# Patient Record
Sex: MALE | Race: WHITE | Employment: OTHER | URBAN - METROPOLITAN AREA
[De-identification: names, ages, dates, MRNs, and addresses within clinical notes are randomized per-mention and may not be internally consistent; named-entity substitution may affect disease eponyms.]

---

## 2017-01-10 ENCOUNTER — OFFICE VISIT (OUTPATIENT)
Dept: PAIN MEDICINE | Facility: CLINIC | Age: 57
End: 2017-01-10

## 2017-01-10 VITALS
BODY MASS INDEX: 23.25 KG/M2 | WEIGHT: 153.4 LBS | TEMPERATURE: 98.2 F | HEART RATE: 82 BPM | SYSTOLIC BLOOD PRESSURE: 133 MMHG | HEIGHT: 68 IN | RESPIRATION RATE: 16 BRPM | OXYGEN SATURATION: 97 % | DIASTOLIC BLOOD PRESSURE: 77 MMHG

## 2017-01-10 DIAGNOSIS — M47.816 SPONDYLOSIS OF LUMBAR REGION WITHOUT MYELOPATHY OR RADICULOPATHY: Primary | ICD-10-CM

## 2017-01-10 DIAGNOSIS — M47.816 SPONDYLOSIS OF LUMBAR REGION WITHOUT MYELOPATHY OR RADICULOPATHY: ICD-10-CM

## 2017-01-10 DIAGNOSIS — M19.90 ARTHRITIS: ICD-10-CM

## 2017-01-10 DIAGNOSIS — M47.816 FACET ARTHROPATHY, LUMBAR: ICD-10-CM

## 2017-01-10 PROBLEM — K58.0 IRRITABLE BOWEL SYNDROME WITH DIARRHEA: Status: ACTIVE | Noted: 2017-01-10

## 2017-01-10 PROCEDURE — 99213 OFFICE O/P EST LOW 20 MIN: CPT | Performed by: PAIN MEDICINE

## 2017-01-10 RX ORDER — DICLOFENAC SODIUM AND MISOPROSTOL 50; 200 MG/1; UG/1
1 TABLET, DELAYED RELEASE ORAL 2 TIMES DAILY PRN
Qty: 60 TABLET | Refills: 2 | Status: SHIPPED | OUTPATIENT
Start: 2017-01-10 | End: 2017-01-10 | Stop reason: SDUPTHER

## 2017-01-10 RX ORDER — DICLOFENAC SODIUM AND MISOPROSTOL 50; 200 MG/1; UG/1
1 TABLET, DELAYED RELEASE ORAL 2 TIMES DAILY PRN
Qty: 60 TABLET | Refills: 2 | Status: SHIPPED | OUTPATIENT
Start: 2017-01-10

## 2017-01-10 NOTE — PATIENT INSTRUCTIONS
- Given good benefit from two previous diagnosistic lumbar medial branch blocks, would likely receive longer pain relief with radiofrequency ablation of the lumbar medial branch nerves. Discussed with the patient regarding the etiology of their pain. Informed them that they would likely benefit from a bilateral radiofrequency ablation of the lumbar medial branch nerves from L3 to SA.  The procedure was described in detail and the risks, benefits and alternatives were discussed with the patient (including but not limited to: bleeding, infection, nerve damage, worsening of pain, inability to perform injection, paralysis, seizures, and death) who agreed to proceed. No sedation.     Radiofrequency Lesioning  Radiofrequency lesioning is a procedure that is performed to relieve pain. The procedure is often used for back, neck, or arm pain. Radiofrequency lesioning involves the use of a machine that creates radio waves to make heat. During the procedure, the heat is applied to the nerve that carries the pain signal. The heat damages the nerve and interferes with the pain signal. Pain relief usually lasts for 6 months to 1 year.  LET YOUR HEALTH CARE PROVIDER KNOW ABOUT:  · Any allergies you have.  · All medicines you are taking, including vitamins, herbs, eye drops, creams, and over-the-counter medicines.  · Previous problems you or members of your family have had with the use of anesthetics.  · Any blood disorders you have.  · Previous surgeries you have had.  · Any medical conditions you have.  · Whether you are pregnant or may be pregnant.  RISKS AND COMPLICATIONS  Generally, this is a safe procedure. However, problems may occur, including:  · Pain or soreness at the injection site.  · Infection at the injection site.  · Damage to nerves or blood vessels.  BEFORE THE PROCEDURE  · Ask your health care provider about:    Changing or stopping your regular medicines. This is especially important if you are taking diabetes  medicines or blood thinners.    Taking medicines such as aspirin and ibuprofen. These medicines can thin your blood. Do not take these medicines before your procedure if your health care provider instructs you not to.  · Follow instructions from your health care provider about eating or drinking restrictions.  · Plan to have someone take you home after the procedure.  · If you go home right after the procedure, plan to have someone with you for 24 hours.  PROCEDURE  · You will be given one or more of the following:    A medicine to help you relax (sedative).    A medicine to numb the area (local anesthetic).  · You will be awake during the procedure. You will need to be able to talk with the health care provider during the procedure.  · With the help of a type of X-ray (fluoroscopy), the health care provider will insert a radiofrequency needle into the area to be treated.  · Next, a wire that carries the radio waves (electrode) will be put through the radiofrequency needle. An electrical pulse will be sent through the electrode to verify the correct nerve. You will feel a tingling sensation, and you may have muscle twitching.  · Then, the tissue that is around the needle tip will be heated by an electric current that is passed using the radiofrequency machine. This will numb the nerves.  · A bandage (dressing) will be put on the insertion area after the procedure is done.  The procedure may vary among health care providers and hospitals.  AFTER THE PROCEDURE  · Your blood pressure, heart rate, breathing rate, and blood oxygen level will be monitored often until the medicines you were given have worn off.  · Return to your normal activities as directed by your health care provider.     This information is not intended to replace advice given to you by your health care provider. Make sure you discuss any questions you have with your health care provider.     Document Released: 08/15/2012 Document Revised: 09/07/2016  Document Reviewed: 01/25/2016  [employer] Interactive Patient Education ©2016 [employer]

## 2017-01-10 NOTE — PROGRESS NOTES
"CHIEF COMPLAINT: Back Pain    HPI  Roddy Steen is a 56 y.o. male.  He is here to follow up for Back Pain    Roddy Steen is a 56 y.o. male  who presents to the office for follow-up of procedure.  He completed a Bilateral lumbar facets on 11-02-16 and 10-19-16 performed for management of back pain. Patient reports 50-60% relief from the procedure. He said it helped for a few days to several days. But he states he has been very active and hasn't had an opportunity to really relax. He is trying to put up a fence for his five dogs.     Since last visit their pain has remain unchanged. He states the numbness and sciatica in his left leg has gotten worse. And has had pain in his left hip the past few days. He has a painful discomfort \"right square\" in the middle of his back which he states is constant. He states [facility] so he hasn't taken it yet. His IBS has gotten so bad this week he doesn't want to eat, and has lost 4 pounds since last week.    The patient states their pain is a 4 on a scale of 1-10 while sitting.  The patient describes this pain as a constant numbness, ache, sharp, shooting, stabbing, tingling, throbbing and deep. The pain is located in low back L>R and radiates down posterior aspect of left leg stopping before knee. This painful problem is aggravated by bending, lifting, movement and sitting,standing,walking and is alleviated by nothing. Main pain is not his intermittent leg radiation but is his constant axial pain that is worse with activity.   Daily numbness and tingling in his left toes. Intermittent and brought on by walking with improvement with sitting down. No weakness in BLE.      Past pain medications: occasionally Ibuprofen - twice/monthly  Naproxen - no help  Muscle relaxer - maybe flexeril - no help  Percocet - no help  Norco - no help  Diclofenac- maybe?     Current pain medications: Paxil - on depression/PTSD     Past therapies:  Physical Therapy: yes- it didn't " help last visit was early 2015  Chiropractor: yes - years ago- helped  Massage Therapy: yes - helped some  TENS: yes - helped  Neck or back surgery: no   Laser therapy: by PCP in 2014- didn't helped. Used on low back.     Previous Injection: 11/2/2016 - Bilateral L3-Sa  Effect of Injection (%): couple days  Length of Relief: 50-60%     Previous Injection: 10/19/2016 - Bilateral L3-Sa  Effect of Injection (%): couple days  Length of Relief: 50-60%     Previous Injections: yes He states that he was in pain management in 2014 in [place], [place] with Dr. Madi Taylor. He states that while there he had 3 back injections that didn't help. Thinks they were LESIs?  Relief: no    PEG Assessment   What number best describes your pain on average in the past week? 7  What number best describes how, during the past week, pain has interfered with your enjoyment of life? 7  What number best describes how, during the past week, pain has interfered with your general activity? 8      Current Outpatient Prescriptions:   •  albuterol (PROVENTIL HFA;VENTOLIN HFA) 108 (90 BASE) MCG/ACT inhaler, Inhale 2 puffs every 4 (four) hours as needed for wheezing., Disp: , Rfl:   •  albuterol (PROVENTIL) (2.5 MG/3ML) 0.083% nebulizer solution, Take 2.5 mg by nebulization every 4 (four) hours as needed for wheezing., Disp: , Rfl:   •  atorvastatin (LIPITOR) 10 MG tablet, Take 1 tablet by mouth daily., Disp: , Rfl:   •  eszopiclone (LUNESTA) 3 MG tablet, TAKE ONE TABLET BY MOUTH EVERY NIGHT AT BEDTIME, Disp: 30 tablet, Rfl: 5  •  fluticasone-salmeterol (ADVAIR) 250-50 MCG/DOSE DISKUS, Inhale 2 (two) times a day., Disp: , Rfl:   •  lisinopril (PRINIVIL,ZESTRIL) 30 MG tablet, Take 1 tablet by mouth daily., Disp: , Rfl:   •  mirtazapine (REMERON) 30 MG tablet, Take 0.5 tablets by mouth Every Night., Disp: , Rfl:   •  omeprazole (PriLOSEC) 20 MG capsule, Take 1 capsule (20 mg total) by mouth daily., Disp: 90 capsule, Rfl: 3  •  PARoxetine (PAXIL) 40 MG  tablet, Take 1 tablet by mouth daily., Disp: , Rfl:   •  tiotropium (SPIRIVA) 18 MCG per inhalation capsule, Spiriva HandiHaler 18 MCG Inhalation Capsule; Patient Sig: Spiriva HandiHaler 18 MCG Inhalation Capsule ; 0; 04-Jan-2016; Active, Disp: , Rfl:   •  topiramate (TOPAMAX) 50 MG tablet, Take by mouth., Disp: , Rfl:     IMAGING  LUMBAR MRI 7/8/2016:  FINDINGS:   The L1-2 disc is unremarkable. The canal and foramina are widely patent.  At L2-3 and L3-4, there is broad-based posterior disc bulging and osteophyte formation and moderate bilateral facet hypertrophy that results in moderate central spinal stenosis. Foraminal stenosis is mild bilaterally.  At L4-5, there is a broad-based posterior disc osteophyte complex with a more focal right-sided paracentral disc protrusion. This is accompanied by mild facet hypertrophy. Central spinal stenosis is moderately severe. Foraminal narrowing is mild bilaterally.  At L5-S1, the disc is degenerated and there is disc space collapse with a broad-based posterior disc osteophyte complex and reactive endplate changes. There is mild bilateral facet hypertrophy. Foraminal stenosis is moderate bilaterally and a little worse on the left than on the right. No discrete disc herniation is seen. The conus is normal. There is no evidence of marrow edema or paraspinous mass.   IMPRESSION: Degenerative changes, predominantly, at the lower 4 lumbar discs as described above level-by-level. The most prominent degenerative change is at L4-5 where there is moderately severe central spinal stenosis and a focal right paracentral protruding disc in addition to generalized posterior disc bulging and osteophyte formation. Foraminal stenosis is most prominent bilaterally at the L5-S1 level.    The following portions of the patient's history were reviewed and updated as appropriate: allergies, current medications, past family history, past medical history, past social history, past surgical history  "and problem list.    Review of Systems   Constitutional: Positive for appetite change (decreased) and unexpected weight change (weight loss of 4 lbs put states due to IBS). Negative for activity change, chills and fever.   HENT: Positive for congestion, sneezing and sore throat.    Respiratory: Positive for cough. Negative for shortness of breath and wheezing.    Cardiovascular: Positive for chest pain (pt said this has been ongoing but he doesnt have cardiologist. [facility] and states he will bring it up with them. He was having it back when he lived in [place] and cardioloist ran tests and didn't find anything, states pt). Negative for palpitations.   Gastrointestinal: Positive for abdominal pain and diarrhea. Negative for constipation, nausea and vomiting.   Genitourinary: Negative for difficulty urinating.   Musculoskeletal: Positive for back pain.   Neurological: Positive for numbness. Negative for dizziness, weakness, light-headedness and headaches.   Psychiatric/Behavioral: Positive for agitation and sleep disturbance. Negative for confusion, hallucinations and suicidal ideas. The patient is nervous/anxious.    All other systems reviewed and are negative.      Vitals:    01/10/17 1455   BP: 133/77   Pulse: 82   Resp: 16   Temp: 98.2 °F (36.8 °C)   SpO2: 97%   Weight: 153 lb 6.4 oz (69.6 kg)   Height: 68\" (172.7 cm)   PainSc:   4   PainLoc: Back       Physical Exam   Constitutional: He is oriented to person, place, and time. He appears well-developed and well-nourished. No distress.   HENT:   Head: Normocephalic and atraumatic.   Nose: Nose normal.   Mouth/Throat: Oropharynx is clear and moist.   Eyes: Conjunctivae and EOM are normal.   Neck: Normal range of motion. Neck supple.   Pulmonary/Chest: Effort normal. No stridor. No respiratory distress.   Musculoskeletal:        Lumbar back: He exhibits pain. He exhibits no tenderness and no bony tenderness.   +bilateral lumbar facet loading  "   Neurological: He is oriented to person, place, and time. He has normal strength. Gait normal.   Skin: Skin is warm and dry. No rash noted. He is not diaphoretic.   Psychiatric: He has a normal mood and affect. His behavior is normal.   Nursing note and vitals reviewed.    Ortho Exam  Neurologic Exam     Mental Status   Oriented to person, place, and time.     Cranial Nerves   Cranial nerves II through XII intact.     CN III, IV, VI   Extraocular motions are normal.     Motor Exam     Strength   Strength 5/5 throughout.     Sensory Exam   Light touch normal.     Gait, Coordination, and Reflexes     Gait  Gait: normal      Pain Management Panel     Pain Management Panel Latest Ref Rng 9/22/2016    AMPHETAMINES SCREEN, URINE Negative Negative    BARBITURATES SCREEN Negative Negative    BENZODIAZEPINE SCREEN, URINE Negative Negative    COCAINE SCREEN, URINE Negative Negative    METHADONE SCREEN, URINE Negative Negative        Last UDS: 9/22/2016  Comments: no meds - Consistent       Date of last GRETCHEN reviewed : 01/10/17   Comments: Consistent       Assessment/Plan   Roddy was seen today for back pain.    Diagnoses and all orders for this visit:    Spondylosis of lumbar region without myelopathy or radiculopathy  -     Case Request    Facet arthropathy, lumbar  -     Case Request    - Given good benefit from two previous diagnosistic lumbar medial branch blocks, would likely receive longer pain relief with radiofrequency ablation of the lumbar medial branch nerves. Discussed with the patient regarding the etiology of their pain. Informed them that they would likely benefit from a bilateral radiofrequency ablation of the lumbar medial branch nerves from L3 to SA.  The procedure was described in detail and the risks, benefits and alternatives were discussed with the patient (including but not limited to: bleeding, infection, nerve damage, worsening of pain, inability to perform injection, paralysis, seizures, and  death) who agreed to proceed. No sedation.   - Continue home exercise program.     - Recommend continuing all meds, no changes. Can consider gabapentin in the future if left foot numbness continues, worsens or doesn't improve.   - Consider referral to [facility] in future if lumbar spinal stenosis worsens.   - Will address lumbar radiculopathy after we address lumbar spondylosis. Will consider a LESI in future.  Sounds like lumbar radiculopathy has improved with LMBB and may not need LESI in future.   - If left knee becomes non responsive to gel injections and consider left genicular knee injection in future. Will watch for now and let ortho manage.    - Body mass index is 23.32 kg/(m^2).     Follow-up in 2 months.    Julianna Lima MD  Pain Management

## 2022-06-13 ENCOUNTER — TELEPHONE (OUTPATIENT)
Dept: ORTHOPEDIC SURGERY | Facility: CLINIC | Age: 62
End: 2022-06-13

## 2022-06-13 NOTE — TELEPHONE ENCOUNTER
PATIENT SEEN AT [facility] AND HAS PREVIOUSLY BEEN SEEN BY OTHER ORTHO SURGEONS. CALLED PATIENT TO LET HIM KNOW WE NEED HIS MEDICAL RECORDS FROM THEM TO BE REVIEWED. HE SAID HE WOULD EMAIL THEM OVER.

## 2022-06-27 ENCOUNTER — TELEPHONE (OUTPATIENT)
Dept: ORTHOPEDIC SURGERY | Facility: CLINIC | Age: 62
End: 2022-06-27

## 2022-06-27 NOTE — TELEPHONE ENCOUNTER
STRAIN OF LT ACHILLES TENDON (REF ALSO SAYS RUPTURE). THIS IS FOR 2ND OPINION. MRI LT ANKLE 3/6. RCVD/INDXD PN FROM [facility]: 5/3, 3/22, LABS 2/9, EXT MED RECS, XRAY LT ANKLE 1/22.

## 2024-07-29 ENCOUNTER — OFFICE VISIT (OUTPATIENT)
Dept: ORTHOPEDIC SURGERY | Facility: CLINIC | Age: 64
End: 2024-07-29
Payer: MEDICARE

## 2024-07-29 VITALS
HEART RATE: 65 BPM | HEIGHT: 68 IN | DIASTOLIC BLOOD PRESSURE: 96 MMHG | BODY MASS INDEX: 27.31 KG/M2 | WEIGHT: 180.2 LBS | SYSTOLIC BLOOD PRESSURE: 160 MMHG | OXYGEN SATURATION: 94 %

## 2024-07-29 DIAGNOSIS — M25.532 LEFT WRIST PAIN: Primary | ICD-10-CM

## 2024-07-29 DIAGNOSIS — M77.8 LEFT WRIST TENDONITIS: ICD-10-CM

## 2024-07-29 PROCEDURE — 1159F MED LIST DOCD IN RCRD: CPT | Performed by: STUDENT IN AN ORGANIZED HEALTH CARE EDUCATION/TRAINING PROGRAM

## 2024-07-29 PROCEDURE — 3080F DIAST BP >= 90 MM HG: CPT | Performed by: STUDENT IN AN ORGANIZED HEALTH CARE EDUCATION/TRAINING PROGRAM

## 2024-07-29 PROCEDURE — 3077F SYST BP >= 140 MM HG: CPT | Performed by: STUDENT IN AN ORGANIZED HEALTH CARE EDUCATION/TRAINING PROGRAM

## 2024-07-29 PROCEDURE — 99203 OFFICE O/P NEW LOW 30 MIN: CPT | Performed by: STUDENT IN AN ORGANIZED HEALTH CARE EDUCATION/TRAINING PROGRAM

## 2024-07-29 PROCEDURE — 1160F RVW MEDS BY RX/DR IN RCRD: CPT | Performed by: STUDENT IN AN ORGANIZED HEALTH CARE EDUCATION/TRAINING PROGRAM

## 2024-07-29 PROCEDURE — 20605 DRAIN/INJ JOINT/BURSA W/O US: CPT | Performed by: STUDENT IN AN ORGANIZED HEALTH CARE EDUCATION/TRAINING PROGRAM

## 2024-07-29 RX ORDER — LIDOCAINE HYDROCHLORIDE 10 MG/ML
1 INJECTION, SOLUTION INFILTRATION; PERINEURAL
Status: COMPLETED | OUTPATIENT
Start: 2024-07-29 | End: 2024-07-29

## 2024-07-29 RX ORDER — TRIAMCINOLONE ACETONIDE 40 MG/ML
40 INJECTION, SUSPENSION INTRA-ARTICULAR; INTRAMUSCULAR
Status: COMPLETED | OUTPATIENT
Start: 2024-07-29 | End: 2024-07-29

## 2024-07-29 RX ADMIN — TRIAMCINOLONE ACETONIDE 40 MG: 40 INJECTION, SUSPENSION INTRA-ARTICULAR; INTRAMUSCULAR at 14:34

## 2024-07-29 RX ADMIN — LIDOCAINE HYDROCHLORIDE 1 ML: 10 INJECTION, SOLUTION INFILTRATION; PERINEURAL at 14:34

## 2024-07-29 NOTE — PROGRESS NOTES
"Chief Complaint  Initial Evaluation of the Left Wrist    Subjective          Roddy Steen presents to [facility] for an evaluation of his left wrist.     History of Present Illness    The patient presents here today for an evaluation of his left wrist.  He was installing camera's around his barn when he dropped the drill and injured his wrist. He reports this happened about a year ago. He presents today without a brace. He reports his pain improved but has since returned. He did have a steroid injection in the past with great relief.     No Known Allergies     Social History     Socioeconomic History    Marital status:    Tobacco Use    Smoking status: Former     Types: Cigarettes    Smokeless tobacco: Current     Types: Snuff    Tobacco comments:     [age], 1 ppd   Vaping Use    Vaping status: Never Used   Substance and Sexual Activity    Alcohol use: Yes    Drug use: No    Sexual activity: Yes     Partners: Female        I reviewed the patient's chief complaint, history of present illness, review of systems, past medical history, surgical history, family history, social history, medications, and allergy list.     REVIEW OF SYSTEMS    Constitutional: Denies fevers, chills, weight loss  Cardiovascular: Denies chest pain, shortness of breath  Skin: Denies rashes, acute skin changes  Neurologic: Denies headache, loss of consciousness  MSK: Left wrist pain       Objective   Vital Signs:   /96   Pulse 65   Ht 172.7 cm (67.99\")   Wt 81.7 kg (180 lb 3.2 oz)   SpO2 94%   BMI 27.41 kg/m²     Body mass index is 27.41 kg/m².    Physical Exam    General: Alert. No acute distress.   Left upper extremity: 45 degrees extension, 60 degrees flexion, 90 degrees supination  and pronation, neurovascularly intact ,no mechanical symptoms,  sensation intact to the medial , radial and ulnar nerve.  Palpable radial pulse.    Medium Joint Arthrocentesis  Date/Time: 7/29/2024 2:34 PM  Consent " given by: patient  Site marked: site marked  Timeout: Immediately prior to procedure a time out was called to verify the correct patient, procedure, equipment, support staff and site/side marked as required   Supporting Documentation  Indications: pain   Procedure Details  Location: wrist (LEFT) -   Needle size: 23 G  Medications administered: 1 mL lidocaine 1 %; 40 mg triamcinolone acetonide 40 MG/ML  Patient tolerance: patient tolerated the procedure well with no immediate complications      This injection documentation was Scribed for Kenn Max MD by Elodia Qureshi.  07/29/24   14:35 EDT    MRI left wrist    [facility]   09/27/23  Impression :   Extensor carpi ulnaris tendinosis and tenosynovitis with a split tear at the ulnar styloid     Imaging Results (Most Recent)       Procedure Component Value Units Date/Time    XR Wrist 2 View Left [[id number]] Resulted: 07/29/24 1455     Updated: 07/29/24 1455    Narrative:      Indications: Left wrist pain    Views: AP and lateral left wrist    Findings: No fractures are seen.  There are mild degenerative changes in   the wrist and hand.  All joints appear reduced.    Comparative Data: No comparative data available                     Assessment and Plan        XR Wrist 2 View Left    Result Date: 7/29/2024  Narrative: Indications: Left wrist pain Views: AP and lateral left wrist Findings: No fractures are seen.  There are mild degenerative changes in the wrist and hand.  All joints appear reduced. Comparative Data: No comparative data available    XR L Spine Ap & Lateral-Standing    Result Date: 7/1/2024  Narrative: This result has an attachment that is not available. AP    Impression: and lateral x-rays of the lumbar spine demonstrate the instrumentation and graft in good position. No evidence of loosening or subsidence. Fusion appears to be incorporating adequately.  He has a moderate adjacent level degeneration L2-L5 with pretty marked retrolisthesis at L4-5.       Diagnoses and all orders for this visit:    1. Left wrist pain (Primary)  -     XR Wrist 2 View Left    2. Left wrist tendonitis    Other orders  -     Medium Joint Arthrocentesis      The patient presents here today for an evaluation  of his left wrist. X-rays were obtained in the office today and these were reviewed today.     Discussed the risks and benefits of a left wrist steroid injection today in the office. Patient expressed understanding and wishes to proceed. He tolerated the injection well and without any complications.      Call or return if worsening symptoms.    Scribed for Kenn Max MD by Maliha Maxwell  07/29/2024   13:47 EDT         Follow Up       As needed    Patient was given instructions and counseling regarding his condition or for health maintenance advice. Please see specific information pulled into the AVS if appropriate.       I have personally performed the services described in this document as scribed by the above individual and it is both accurate and complete. Kenn Max MD 07/29/24 16:17 EDT

## 2024-10-10 NOTE — PROGRESS NOTES
"Chief Complaint: Elevated PSA    Subjective         History of Present Illness  Roddy Steen is a 64 y.o. male presents to [facility] to be seen for elevated PSA.    Patient presents reporting he was found to have elevated PSA on routine screening.     Frequency-denies    Urgency-admits    Incontinence-denies    Nocturia-2-3    Perineal pain-denies    Dysuria-denies    Stream-good    GH-denies    History of stones-denies     surgeries-vasectomy    Family history of  malignancy-father- prostate CA- around 75 at dx, brother- prostate CA- around 59 years old at dx    Cardiopulmonary-HTN, leaky valve- \"cleared\"; COPD, pulmonary fibrosis    Anticoagulants-denies    Smoker-quit 1/2011; smoked for 35 years    PSA  7/15/2024 6.864    Objective     Past Medical History:   Diagnosis Date    COPD (chronic obstructive pulmonary disease)     Emphysema of lung     Fibrosis lung     History of colonoscopy     Complete    Hypertension     IBS (irritable bowel syndrome)     Migraine     PTSD (post-traumatic stress disorder)     Rotator cuff tear     Sleep apnea        Past Surgical History:   Procedure Laterality Date    APPENDECTOMY  02/2005    PALATOPHARYNGOPLASTY  06/2010    ROTATOR CUFF REPAIR Right 11/2015    VASECTOMY  06/1963    Vas Deferens Vasectomy         Current Outpatient Medications:     albuterol (PROVENTIL HFA;VENTOLIN HFA) 108 (90 BASE) MCG/ACT inhaler, Inhale 2 puffs Every 4 (Four) Hours As Needed for Wheezing., Disp: , Rfl:     albuterol (PROVENTIL) (2.5 MG/3ML) 0.083% nebulizer solution, Take 2.5 mg by nebulization Every 4 (Four) Hours As Needed for Wheezing., Disp: , Rfl:     amLODIPine (NORVASC) 10 MG tablet, Take 0.5 tablets by mouth Daily., Disp: , Rfl:     atorvastatin (LIPITOR) 10 MG tablet, Take 1 tablet by mouth Daily., Disp: , Rfl:     celecoxib (CeleBREX) 200 MG capsule, Take 1 capsule by mouth Daily., Disp: , Rfl:     cetirizine (zyrTEC) 10 MG tablet, Take 0.5 tablets by " mouth Daily., Disp: , Rfl:     cholestyramine (QUESTRAN) 4 GM/DOSE powder, Take 1 packet by mouth 2 (Two) Times a Day With Meals., Disp: , Rfl:     Diclofenac Sodium (VOLTAREN) 1 % gel gel, Apply 4 g topically to the appropriate area as directed 2 (Two) Times a Day., Disp: , Rfl:     EPINEPHrine (EPIPEN) 0.3 MG/0.3ML solution auto-injector injection, Inject 0.15 mg into the appropriate muscle as directed by prescriber 1 (One) Time., Disp: , Rfl:     escitalopram (LEXAPRO) 20 MG tablet, Take 1 tablet by mouth Daily., Disp: , Rfl:     hydroCHLOROthiazide 25 MG tablet, Take 1 tablet by mouth Daily., Disp: , Rfl:     hydrOXYzine (ATARAX) 50 MG tablet, Take 1 tablet by mouth Every 4 (Four) Hours As Needed., Disp: , Rfl:     ibuprofen (ADVIL,MOTRIN) 800 MG tablet, Take 1 tablet by mouth Every 8 (Eight) Hours As Needed., Disp: , Rfl:     losartan (COZAAR) 100 MG tablet, Take 0.5 tablets by mouth Daily., Disp: , Rfl:     montelukast (SINGULAIR) 10 MG tablet, Take 1 tablet by mouth Every Night., Disp: , Rfl:     mupirocin (BACTROBAN) 2 % ointment, Apply 1 Application topically to the appropriate area as directed Daily., Disp: , Rfl:     PARoxetine (PAXIL) 40 MG tablet, Take 1 tablet by mouth Daily., Disp: , Rfl:     propranolol (INDERAL) 20 MG tablet, Take 0.5 tablets by mouth 2 (Two) Times a Day., Disp: , Rfl:     SUMAtriptan (IMITREX) 25 MG tablet, Take 2 tablets by mouth 1 (One) Time., Disp: , Rfl:     Trelegy Ellipta 100-62.5-25 MCG/ACT inhaler, Inhale 1 puff Daily., Disp: , Rfl:     diclofenac-misoprostol (ARTHROTEC 50) 50-0.2 MG EC tablet, Take 1 tablet by mouth 2 (Two) Times a Day As Needed (pain)., Disp: 60 tablet, Rfl: 2    fluticasone-salmeterol (ADVAIR) 250-50 MCG/DOSE DISKUS, Inhale 2 (two) times a day., Disp: , Rfl:     mirtazapine (REMERON) 30 MG tablet, Take 0.5 tablets by mouth Every Night., Disp: , Rfl:     tiotropium (SPIRIVA) 18 MCG per inhalation capsule, Spiriva HandiHaler 18 MCG Inhalation Capsule;  "Patient Sig: Spiriva HandiHaler 18 MCG Inhalation Capsule ; 0; 04-Jan-2016; Active, Disp: , Rfl:     Allergies   Allergen Reactions    Gabapentin Hallucinations and Other (See Comments)     Gabapentin mixed with hydrocodone, delusions hallucinations    Hallucinations- when combined with Hydrocodone        Family History   Problem Relation Age of Onset    Alcohol abuse Mother     Hypertension Father     Colon cancer Father     Alcohol abuse Brother        Social History     Socioeconomic History    Marital status:    Tobacco Use    Smoking status: Former     Types: Cigarettes     Passive exposure: Past    Smokeless tobacco: Current     Types: Snuff    Tobacco comments:     15-50, 1 ppd   Vaping Use    Vaping status: Never Used   Substance and Sexual Activity    Alcohol use: Yes    Drug use: No    Sexual activity: Yes     Partners: Female       Vital Signs:   /85 (BP Location: Left arm, Patient Position: Sitting, Cuff Size: Large Adult)   Pulse 61   Ht 172.7 cm (67.99\")   Wt 82.1 kg (181 lb)   BMI 27.53 kg/m²      Physical Exam  Vitals reviewed.   Constitutional:       Appearance: Normal appearance.   Neurological:      General: No focal deficit present.      Mental Status: He is alert and oriented to person, place, and time.   Psychiatric:         Mood and Affect: Mood normal.         Behavior: Behavior normal.          Result Review :   The following data was reviewed by: JOHANNE Aldridge on 10/14/2024:       Bladder Scan interpretation 10/14/2024    Estimation of residual urine via [email or web address] Bladder Scan  MA/nurse performing: Galina CASTRO MA  Residual Urine: 0 ml  Indication: Elevated prostate specific antigen (PSA)    Elevated blood pressure reading   Position: Supine  Examination: Incremental scanning of the suprapubic area using 2.0 MHz transducer using copious amounts of acoustic gel.   Findings: An anechoic area was demonstrated which represented the bladder, with " measurement of residual urine as noted. I inspected this myself. In that the residual urine was stable or insignificant, refer to plan for treatment and plan necessary at this time.         Procedures        Assessment and Plan    Diagnoses and all orders for this visit:    1. Elevated prostate specific antigen (PSA) (Primary)  -     Bladder Scan  -     MRI Prostate With & Without Contrast; Future    2. Elevated blood pressure reading    Patient was advised to follow-up with his PCP regarding elevated blood pressure.    Elevated PSA-lab results discussed at length with patient.  Discussed several options with patient including a standard prostate biopsy; obtaining an MRI of prostate with possible subsequent fusion biopsy as results dictate; or to treat with a month-long course of antibiotics and then repeat the PSA in 3 months.    Given that he reported that he had previously had an elevation in his elevated again and he has an extensive family history of prostate cancer, we will go ahead and order an MRI of the prostate to delineate any underlying etiology of elevated PSA and potentially provide mapping for fusion biopsy.    I will see him back 1 week after MRI to go over results and come up with a plan.    Follow Up   No follow-ups on file.  Patient was given instructions and counseling regarding his condition or for health maintenance advice. Please see specific information pulled into the AVS if appropriate.         This document has been electronically signed by JOHANNE Aldridge  October 14, 2024 11:26 EDT

## 2024-10-14 ENCOUNTER — OFFICE VISIT (OUTPATIENT)
Dept: UROLOGY | Facility: CLINIC | Age: 64
End: 2024-10-14
Payer: OTHER GOVERNMENT

## 2024-10-14 VITALS
HEART RATE: 61 BPM | WEIGHT: 181 LBS | DIASTOLIC BLOOD PRESSURE: 85 MMHG | SYSTOLIC BLOOD PRESSURE: 179 MMHG | BODY MASS INDEX: 27.43 KG/M2 | HEIGHT: 68 IN

## 2024-10-14 DIAGNOSIS — R97.20 ELEVATED PROSTATE SPECIFIC ANTIGEN (PSA): Primary | ICD-10-CM

## 2024-10-14 DIAGNOSIS — R03.0 ELEVATED BLOOD PRESSURE READING: ICD-10-CM

## 2024-10-14 LAB — URINE VOLUME: 0

## 2024-10-14 PROCEDURE — 99203 OFFICE O/P NEW LOW 30 MIN: CPT | Performed by: NURSE PRACTITIONER

## 2024-10-14 PROCEDURE — 51798 US URINE CAPACITY MEASURE: CPT | Performed by: NURSE PRACTITIONER

## 2024-10-14 RX ORDER — PROPRANOLOL HCL 20 MG
10 TABLET ORAL 2 TIMES DAILY
COMMUNITY

## 2024-10-14 RX ORDER — IBUPROFEN 800 MG/1
800 TABLET, FILM COATED ORAL EVERY 8 HOURS PRN
COMMUNITY
Start: 2024-07-17

## 2024-10-14 RX ORDER — HYDROCHLOROTHIAZIDE 25 MG/1
1 TABLET ORAL DAILY
COMMUNITY

## 2024-10-14 RX ORDER — CHOLESTYRAMINE 4 G/9G
4 POWDER, FOR SUSPENSION ORAL 2 TIMES DAILY WITH MEALS
COMMUNITY

## 2024-10-14 RX ORDER — SUMATRIPTAN 25 MG/1
50 TABLET, FILM COATED ORAL ONCE
COMMUNITY

## 2024-10-14 RX ORDER — LOSARTAN POTASSIUM 100 MG/1
50 TABLET ORAL DAILY
COMMUNITY

## 2024-10-14 RX ORDER — MONTELUKAST SODIUM 10 MG/1
10 TABLET ORAL NIGHTLY
COMMUNITY

## 2024-10-14 RX ORDER — HYDROXYZINE HYDROCHLORIDE 50 MG/1
50 TABLET, FILM COATED ORAL EVERY 4 HOURS PRN
COMMUNITY

## 2024-10-14 RX ORDER — MUPIROCIN 20 MG/G
1 OINTMENT TOPICAL DAILY
COMMUNITY
Start: 2024-06-24

## 2024-10-14 RX ORDER — FLUTICASONE FUROATE, UMECLIDINIUM BROMIDE AND VILANTEROL TRIFENATATE 100; 62.5; 25 UG/1; UG/1; UG/1
1 POWDER RESPIRATORY (INHALATION)
COMMUNITY
Start: 2024-07-24

## 2024-10-14 RX ORDER — AMLODIPINE BESYLATE 10 MG/1
5 TABLET ORAL DAILY
COMMUNITY

## 2024-10-14 RX ORDER — CETIRIZINE HYDROCHLORIDE 10 MG/1
5 TABLET ORAL DAILY
COMMUNITY

## 2024-10-14 RX ORDER — ESCITALOPRAM OXALATE 20 MG/1
1 TABLET ORAL DAILY
COMMUNITY

## 2024-10-14 RX ORDER — EPINEPHRINE 0.3 MG/.3ML
0.15 INJECTION SUBCUTANEOUS ONCE
COMMUNITY

## 2024-10-14 RX ORDER — CELECOXIB 200 MG/1
200 CAPSULE ORAL DAILY
COMMUNITY

## 2025-01-14 NOTE — PROGRESS NOTES
"Chief Complaint: Elevated PSA    Subjective         History of Present Illness  Roddy Steen is a 64 y.o. male presents to [facility] to be seen for follow-up.    Patient was previously seen by me with last visit on 10/14/2024 for elevated PSA.  He is here to follow-up on his MRI results.  Results of MRI reviewed with patient.        MRI results.  1/8/2025  Prostate volume 29 cc, PSA density 0.24  Impression:  1.  A lesion in the left anterior transition zone at the mid gland is at high suspicion for malignancy with an overall PI-RADS score of 4    Previous 10/14/2024:  Patient presents reporting he was found to have elevated PSA on routine screening.      Frequency-denies   Urgency-admits   Incontinence-denies   Nocturia-2-3   Perineal pain-denies   Dysuria-denies   Stream-good   GH-denies   History of stones-denies    surgeries-vasectomy   Family history of  malignancy-father- prostate CA- around 75 at dx, brother- prostate CA- around 59 years old at dx   Cardiopulmonary-HTN, leaky valve- \"cleared\"; COPD, pulmonary fibrosis   Anticoagulants-denies   Smoker-quit 1/2011; smoked for 35 years     PSA  7/15/2024 6.864    Objective     Past Medical History:   Diagnosis Date    COPD (chronic obstructive pulmonary disease)     Emphysema of lung     Fibrosis lung     History of colonoscopy     Complete    Hypertension     IBS (irritable bowel syndrome)     Migraine     PTSD (post-traumatic stress disorder)     Rotator cuff tear     Sleep apnea        Past Surgical History:   Procedure Laterality Date    APPENDECTOMY  02/2005    PALATOPHARYNGOPLASTY  06/2010    ROTATOR CUFF REPAIR Right 11/2015    VASECTOMY  06/1963    Vas Deferens Vasectomy         Current Outpatient Medications:     albuterol (PROVENTIL HFA;VENTOLIN HFA) 108 (90 BASE) MCG/ACT inhaler, Inhale 2 puffs Every 4 (Four) Hours As Needed for Wheezing., Disp: , Rfl:     amLODIPine (NORVASC) 10 MG tablet, Take 0.5 tablets by mouth " Daily., Disp: , Rfl:     atorvastatin (LIPITOR) 10 MG tablet, Take 1 tablet by mouth Daily., Disp: , Rfl:     celecoxib (CeleBREX) 200 MG capsule, Take 1 capsule by mouth Daily., Disp: , Rfl:     cetirizine (zyrTEC) 10 MG tablet, Take 0.5 tablets by mouth Daily., Disp: , Rfl:     EPINEPHrine (EPIPEN) 0.3 MG/0.3ML solution auto-injector injection, Inject 0.15 mg into the appropriate muscle as directed by prescriber 1 (One) Time., Disp: , Rfl:     escitalopram (LEXAPRO) 20 MG tablet, Take 1 tablet by mouth Daily., Disp: , Rfl:     famotidine (PEPCID) 40 MG tablet, Take 1 tablet by mouth Daily., Disp: , Rfl:     fluticasone-salmeterol (ADVAIR) 250-50 MCG/DOSE DISKUS, Inhale 2 (two) times a day., Disp: , Rfl:     hydroCHLOROthiazide 25 MG tablet, Take 1 tablet by mouth Daily., Disp: , Rfl:     ibuprofen (ADVIL,MOTRIN) 800 MG tablet, Take 1 tablet by mouth Every 8 (Eight) Hours As Needed., Disp: , Rfl:     losartan (COZAAR) 100 MG tablet, Take 0.5 tablets by mouth Daily., Disp: , Rfl:     montelukast (SINGULAIR) 10 MG tablet, Take 1 tablet by mouth Every Night., Disp: , Rfl:     mupirocin (BACTROBAN) 2 % ointment, Apply 1 Application topically to the appropriate area as directed Daily., Disp: , Rfl:     PARoxetine (PAXIL) 40 MG tablet, Take 1 tablet by mouth Daily., Disp: , Rfl:     SUMAtriptan (IMITREX) 25 MG tablet, Take 2 tablets by mouth 1 (One) Time., Disp: , Rfl:     Trelegy Ellipta 100-62.5-25 MCG/ACT inhaler, Inhale 1 puff Daily., Disp: , Rfl:     albuterol (PROVENTIL) (2.5 MG/3ML) 0.083% nebulizer solution, Take 2.5 mg by nebulization Every 4 (Four) Hours As Needed for Wheezing. (Patient not taking: Reported on 1/15/2025), Disp: , Rfl:     cholestyramine (QUESTRAN) 4 GM/DOSE powder, Take 1 packet by mouth 2 (Two) Times a Day With Meals. (Patient not taking: Reported on 1/15/2025), Disp: , Rfl:     ciprofloxacin (CIPRO) 500 MG tablet, Take 1 tablet by mouth 2 (Two) Times a Day for 3 days. Start day before  "procedure, Disp: 6 tablet, Rfl: 0    Diclofenac Sodium (VOLTAREN) 1 % gel gel, Apply 4 g topically to the appropriate area as directed 2 (Two) Times a Day. (Patient not taking: Reported on 1/15/2025), Disp: , Rfl:     diclofenac-misoprostol (ARTHROTEC 50) 50-0.2 MG EC tablet, Take 1 tablet by mouth 2 (Two) Times a Day As Needed (pain). (Patient not taking: Reported on 1/15/2025), Disp: 60 tablet, Rfl: 2    hydrOXYzine (ATARAX) 50 MG tablet, Take 1 tablet by mouth Every 4 (Four) Hours As Needed. (Patient not taking: Reported on 1/15/2025), Disp: , Rfl:     mirtazapine (REMERON) 30 MG tablet, Take 0.5 tablets by mouth Every Night., Disp: , Rfl:     propranolol (INDERAL) 20 MG tablet, Take 0.5 tablets by mouth 2 (Two) Times a Day. (Patient not taking: Reported on 1/15/2025), Disp: , Rfl:     tiotropium (SPIRIVA) 18 MCG per inhalation capsule, Spiriva HandiHaler 18 MCG Inhalation Capsule; Patient Sig: Spiriva HandiHaler 18 MCG Inhalation Capsule ; 0; 04-Jan-2016; Active, Disp: , Rfl:     Allergies   Allergen Reactions    Gabapentin Hallucinations and Other (See Comments)     Gabapentin mixed with hydrocodone, delusions hallucinations    Hallucinations- when combined with Hydrocodone        Family History   Problem Relation Age of Onset    Alcohol abuse Mother     Hypertension Father     Colon cancer Father     Alcohol abuse Brother        Social History     Socioeconomic History    Marital status:    Tobacco Use    Smoking status: Former     Types: Cigarettes     Passive exposure: Past    Smokeless tobacco: Current     Types: Snuff    Tobacco comments:     15-50, 1 ppd   Vaping Use    Vaping status: Never Used   Substance and Sexual Activity    Alcohol use: Yes    Drug use: No    Sexual activity: Yes     Partners: Female       Vital Signs:   Resp 14   Ht 172.7 cm (67.99\")   Wt 82.1 kg (181 lb)   BMI 27.53 kg/m²      Physical Exam  Vitals reviewed.   Constitutional:       Appearance: Normal appearance. "   Neurological:      General: No focal deficit present.      Mental Status: He is alert and oriented to person, place, and time.   Psychiatric:         Mood and Affect: Mood normal.         Behavior: Behavior normal.          Result Review :   The following data was reviewed by: JOHANNE Aldridge on 01/15/2025:  Results for orders placed or performed in visit on 10/14/24   Bladder Scan    Collection Time: 10/14/24 11:10 AM   Result Value Ref Range    Urine Volume 0             Procedures        Assessment and Plan    Diagnoses and all orders for this visit:    1. Elevated prostate specific antigen (PSA) (Primary)  -     ciprofloxacin (CIPRO) 500 MG tablet; Take 1 tablet by mouth 2 (Two) Times a Day for 3 days. Start day before procedure  Dispense: 6 tablet; Refill: 0  -     Case Request; Standing  -     sodium chloride 0.9 % flush 3 mL  -     sodium chloride 0.9 % flush 10 mL  -     sodium chloride 0.9 % infusion 40 mL  -     cefTRIAXone (ROCEPHIN) injection 1 g  -     Case Request    Other orders  -     Follow Anesthesia Guidelines / Protocol; Future  -     Follow Anesthesia Guidelines / Protocol; Standing  -     Verify / Perform Chlorhexidine Skin Prep; Standing  -     Provide NPO Instructions to Patient; Future  -     Chlorhexidine Skin Prep; Future  -     Insert Peripheral IV; Standing  -     Saline Lock & Maintain IV Access; Standing  -     Place Sequential Compression Device; Standing  -     Maintain Sequential Compression Device; Standing    Results of MRI reviewed with patient.  He does understand that he will need to have an MRI fusion biopsy.  He does understand that he will start on Cipro the day before the procedure.  He does understand that he will be n.p.o. after midnight with exception of sips of water to take his medications.  He is not on any blood thinners or diabetes or weight loss medications.     Risk, benefits, and alternatives of the patient were discussed at length.  Risks including  but not limited to bleeding, infection, damage to surrounding structure, pain, need for further procedures.  Also aware this is a procedure performed under anesthesia which has inherent risks including up to death.  Patient notes understanding, agreeable to proceed.     He will follow-up with Dr. Garcia after the procedure to go over the results and come up with a plan of treatment.    Follow Up   No follow-ups on file.  Patient was given instructions and counseling regarding his condition or for health maintenance advice. Please see specific information pulled into the AVS if appropriate.         This document has been electronically signed by JOHANNE Aldridge  January 15, 2025 11:48 EST

## 2025-01-15 ENCOUNTER — OFFICE VISIT (OUTPATIENT)
Dept: UROLOGY | Age: 65
End: 2025-01-15
Payer: OTHER GOVERNMENT

## 2025-01-15 VITALS — WEIGHT: 181 LBS | RESPIRATION RATE: 14 BRPM | BODY MASS INDEX: 27.43 KG/M2 | HEIGHT: 68 IN

## 2025-01-15 DIAGNOSIS — R97.20 ELEVATED PROSTATE SPECIFIC ANTIGEN (PSA): Primary | ICD-10-CM

## 2025-01-15 RX ORDER — SODIUM CHLORIDE 0.9 % (FLUSH) 0.9 %
10 SYRINGE (ML) INJECTION AS NEEDED
OUTPATIENT
Start: 2025-01-15

## 2025-01-15 RX ORDER — SODIUM CHLORIDE 0.9 % (FLUSH) 0.9 %
3 SYRINGE (ML) INJECTION EVERY 12 HOURS SCHEDULED
OUTPATIENT
Start: 2025-01-15

## 2025-01-15 RX ORDER — FAMOTIDINE 40 MG/1
1 TABLET, FILM COATED ORAL DAILY
COMMUNITY
Start: 2024-11-27

## 2025-01-15 RX ORDER — CIPROFLOXACIN 500 MG/1
500 TABLET, FILM COATED ORAL 2 TIMES DAILY
Qty: 6 TABLET | Refills: 0 | Status: SHIPPED | OUTPATIENT
Start: 2025-01-15 | End: 2025-01-18

## 2025-01-15 RX ORDER — CEFTRIAXONE 1 G/1
1 INJECTION, POWDER, FOR SOLUTION INTRAMUSCULAR; INTRAVENOUS ONCE
OUTPATIENT
Start: 2025-01-15

## 2025-01-15 RX ORDER — SODIUM CHLORIDE 9 MG/ML
40 INJECTION, SOLUTION INTRAVENOUS AS NEEDED
OUTPATIENT
Start: 2025-01-15

## 2025-01-28 ENCOUNTER — TELEPHONE (OUTPATIENT)
Dept: UROLOGY | Age: 65
End: 2025-01-28
Payer: OTHER GOVERNMENT

## 2025-01-28 DIAGNOSIS — R97.20 ELEVATED PROSTATE SPECIFIC ANTIGEN (PSA): Primary | ICD-10-CM

## 2025-01-28 RX ORDER — CIPROFLOXACIN 500 MG/1
500 TABLET, FILM COATED ORAL 2 TIMES DAILY
Qty: 6 TABLET | Refills: 0 | Status: SHIPPED | OUTPATIENT
Start: 2025-01-28

## 2025-01-28 RX ORDER — FLUTICASONE PROPIONATE AND SALMETEROL 500; 50 UG/1; UG/1
POWDER RESPIRATORY (INHALATION)
COMMUNITY

## 2025-01-28 NOTE — TELEPHONE ENCOUNTER
IVAN IN [facility] CALLED, THE PREOP CIPRO NEEDS TO BE RESENT TO [facility]. IT WAS SENT TO ANOTHER PHARMACY AND HIS INSURANCE WOULD NOT COVER IT THERE. CAN YOU DO THIS, [facility] IS OUT THIS WEEK. PROCEDURE IS 1/30.  
NOTIFIED PT THAT PREOP CIPRO WAS SENT TO [facility]   
Yes

## 2025-01-28 NOTE — PRE-PROCEDURE INSTRUCTIONS
IMPORTANT INSTRUCTIONS - PRE-ADMISSION TESTING  DO NOT EAT OR CHEW anything after midnight the night before your procedure.    You may have CLEAR liquids up to _2_____ hours prior to ARRIVAL time.   Take the following medications the morning of your procedure with JUST A SIP OF WATER:  NONE (TAKES ALL MEDS AT NIGHT) - INSTRUCTED TO USE INHALERS _______________________________________________________________________________________________________________________________________________________________________________________    DO NOT BRING your medications to the hospital with you, UNLESS something has changed since your PRE-Admission Testing appointment.  Hold all vitamins, supplements, and NSAIDS (Non- steroidal anti-inflammatory meds) for one week prior to surgery (you MAY take Tylenol or Acetaminophen).  If you are diabetic, check your blood sugar the morning of your procedure. If it is less than 70 or if you are feeling symptomatic, call the following number for further instructions: [phone]  Use your inhalers/nebulizers as usual, the morning of your procedure. BRING YOUR INHALERS with you.   Bring your CPAP or BIPAP to hospital, ONLY IF YOU WILL BE SPENDING THE NIGHT.   Make sure you have a ride home and have someone who will stay with you the day of your procedure after you go home.  If you have any questions, please call your Pre-Admission Testing Nurse, SAMIA_____ at [phone]_.   Per anesthesia request, do not smoke for 24 hours before your procedure or as instructed by your surgeon.      Clear Liquid Diet        Find out when you need to start a clear liquid diet.   Think of “clear liquids” as anything you could read a newspaper through. This includes things like water, broth, sports drinks, or tea WITHOUT any kind of milk or cream.           Once you are told to start a clear liquid diet, only drink these things until 2 hours before arrival to the hospital or when the hospital says  to stop. Total volume limitation: 8 oz.       Clear liquids you CAN drink:   Water   Clear broth: beef, chicken, vegetable, or bone broth with nothing in it   Gatorade   Lemonade or Ravinder-aid   Soda   Tea, coffee (NO cream or honey)   Jell-O (without fruit)   Popsicles (without fruit or cream)   Italian ices   Juice without pulp: apple, white, grape   You may use salt, pepper, and sugar    Do NOT drink:   Milk or cream   Soy milk, almond milk, coconut milk, or other non-dairy drinks and   creamers   Milkshakes or smoothies   Tomato juice   Orange juice   Grapefruit juice   Cream soups or any other than broth         Clear Liquid Diet:  Do NOT eat any solid food.  Do NOT eat or suck on mints or candy.  Do NOT chew gum.  Do NOT drink thick liquids like milk or juice with pulp in it.  Do NOT add milk, cream, or anything like soy milk or almond milk to coffee or tea.

## 2025-01-30 ENCOUNTER — ANESTHESIA EVENT (OUTPATIENT)
Dept: PERIOP | Facility: HOSPITAL | Age: 65
End: 2025-01-30
Payer: OTHER GOVERNMENT

## 2025-01-30 ENCOUNTER — HOSPITAL ENCOUNTER (OUTPATIENT)
Facility: HOSPITAL | Age: 65
Setting detail: HOSPITAL OUTPATIENT SURGERY
Discharge: HOME | End: 2025-01-30
Attending: UROLOGY | Admitting: UROLOGY
Payer: OTHER GOVERNMENT

## 2025-01-30 ENCOUNTER — ANESTHESIA (OUTPATIENT)
Dept: PERIOP | Facility: HOSPITAL | Age: 65
End: 2025-01-30
Payer: OTHER GOVERNMENT

## 2025-01-30 VITALS
RESPIRATION RATE: 16 BRPM | WEIGHT: 182.1 LBS | OXYGEN SATURATION: 100 % | DIASTOLIC BLOOD PRESSURE: 90 MMHG | SYSTOLIC BLOOD PRESSURE: 161 MMHG | HEIGHT: 68 IN | BODY MASS INDEX: 27.6 KG/M2 | HEART RATE: 62 BPM | TEMPERATURE: 97.8 F

## 2025-01-30 DIAGNOSIS — R97.20 ELEVATED PROSTATE SPECIFIC ANTIGEN (PSA): ICD-10-CM

## 2025-01-30 PROCEDURE — 25010000002 ONDANSETRON PER 1 MG: Performed by: NURSE ANESTHETIST, CERTIFIED REGISTERED

## 2025-01-30 PROCEDURE — 25010000002 DEXAMETHASONE PER 1 MG: Performed by: NURSE ANESTHETIST, CERTIFIED REGISTERED

## 2025-01-30 PROCEDURE — 25010000002 MIDAZOLAM PER 1MG: Performed by: ANESTHESIOLOGY

## 2025-01-30 PROCEDURE — 88342 IMHCHEM/IMCYTCHM 1ST ANTB: CPT | Performed by: UROLOGY

## 2025-01-30 PROCEDURE — 55700 PR PROSTATE NEEDLE BIOPSY ANY APPROACH: CPT | Performed by: UROLOGY

## 2025-01-30 PROCEDURE — 25010000002 CEFTRIAXONE PER 250 MG: Performed by: NURSE PRACTITIONER

## 2025-01-30 PROCEDURE — 25810000003 LACTATED RINGERS PER 1000 ML: Performed by: ANESTHESIOLOGY

## 2025-01-30 PROCEDURE — 25010000002 PROPOFOL 10 MG/ML EMULSION: Performed by: NURSE ANESTHETIST, CERTIFIED REGISTERED

## 2025-01-30 PROCEDURE — 76942 ECHO GUIDE FOR BIOPSY: CPT | Performed by: UROLOGY

## 2025-01-30 PROCEDURE — 25010000002 LIDOCAINE PF 2% 2 % SOLUTION: Performed by: NURSE ANESTHETIST, CERTIFIED REGISTERED

## 2025-01-30 PROCEDURE — 88305 TISSUE EXAM BY PATHOLOGIST: CPT | Performed by: UROLOGY

## 2025-01-30 RX ORDER — MIDAZOLAM HYDROCHLORIDE 2 MG/2ML
2 INJECTION, SOLUTION INTRAMUSCULAR; INTRAVENOUS ONCE
Status: COMPLETED | OUTPATIENT
Start: 2025-01-30 | End: 2025-01-30

## 2025-01-30 RX ORDER — ONDANSETRON 2 MG/ML
4 INJECTION INTRAMUSCULAR; INTRAVENOUS ONCE AS NEEDED
Status: DISCONTINUED | OUTPATIENT
Start: 2025-01-30 | End: 2025-01-30 | Stop reason: HOSPADM

## 2025-01-30 RX ORDER — PROPOFOL 10 MG/ML
VIAL (ML) INTRAVENOUS AS NEEDED
Status: DISCONTINUED | OUTPATIENT
Start: 2025-01-30 | End: 2025-01-30 | Stop reason: SURG

## 2025-01-30 RX ORDER — SODIUM CHLORIDE 0.9 % (FLUSH) 0.9 %
10 SYRINGE (ML) INJECTION AS NEEDED
Status: DISCONTINUED | OUTPATIENT
Start: 2025-01-30 | End: 2025-01-30 | Stop reason: HOSPADM

## 2025-01-30 RX ORDER — CEFTRIAXONE 1 G/1
1 INJECTION, POWDER, FOR SOLUTION INTRAMUSCULAR; INTRAVENOUS ONCE
Status: COMPLETED | OUTPATIENT
Start: 2025-01-30 | End: 2025-01-30

## 2025-01-30 RX ORDER — SODIUM CHLORIDE, SODIUM LACTATE, POTASSIUM CHLORIDE, CALCIUM CHLORIDE 600; 310; 30; 20 MG/100ML; MG/100ML; MG/100ML; MG/100ML
9 INJECTION, SOLUTION INTRAVENOUS CONTINUOUS PRN
Status: DISCONTINUED | OUTPATIENT
Start: 2025-01-30 | End: 2025-01-30 | Stop reason: HOSPADM

## 2025-01-30 RX ORDER — ONDANSETRON 2 MG/ML
INJECTION INTRAMUSCULAR; INTRAVENOUS AS NEEDED
Status: DISCONTINUED | OUTPATIENT
Start: 2025-01-30 | End: 2025-01-30 | Stop reason: SURG

## 2025-01-30 RX ORDER — ACETAMINOPHEN 500 MG
1000 TABLET ORAL ONCE
Status: COMPLETED | OUTPATIENT
Start: 2025-01-30 | End: 2025-01-30

## 2025-01-30 RX ORDER — ONDANSETRON 4 MG/1
4 TABLET, ORALLY DISINTEGRATING ORAL ONCE AS NEEDED
Status: DISCONTINUED | OUTPATIENT
Start: 2025-01-30 | End: 2025-01-30 | Stop reason: HOSPADM

## 2025-01-30 RX ORDER — PHENYLEPHRINE HCL IN 0.9% NACL 1 MG/10 ML
SYRINGE (ML) INTRAVENOUS AS NEEDED
Status: DISCONTINUED | OUTPATIENT
Start: 2025-01-30 | End: 2025-01-30 | Stop reason: SURG

## 2025-01-30 RX ORDER — LIDOCAINE HYDROCHLORIDE 20 MG/ML
INJECTION, SOLUTION EPIDURAL; INFILTRATION; INTRACAUDAL; PERINEURAL AS NEEDED
Status: DISCONTINUED | OUTPATIENT
Start: 2025-01-30 | End: 2025-01-30 | Stop reason: SURG

## 2025-01-30 RX ORDER — ACETAMINOPHEN 325 MG/1
650 TABLET ORAL ONCE
Status: DISCONTINUED | OUTPATIENT
Start: 2025-01-30 | End: 2025-01-30 | Stop reason: HOSPADM

## 2025-01-30 RX ORDER — PROMETHAZINE HYDROCHLORIDE 12.5 MG/1
12.5 TABLET ORAL ONCE AS NEEDED
Status: DISCONTINUED | OUTPATIENT
Start: 2025-01-30 | End: 2025-01-30 | Stop reason: HOSPADM

## 2025-01-30 RX ORDER — SODIUM CHLORIDE 9 MG/ML
40 INJECTION, SOLUTION INTRAVENOUS AS NEEDED
Status: DISCONTINUED | OUTPATIENT
Start: 2025-01-30 | End: 2025-01-30 | Stop reason: HOSPADM

## 2025-01-30 RX ORDER — DEXAMETHASONE SODIUM PHOSPHATE 4 MG/ML
INJECTION, SOLUTION INTRA-ARTICULAR; INTRALESIONAL; INTRAMUSCULAR; INTRAVENOUS; SOFT TISSUE AS NEEDED
Status: DISCONTINUED | OUTPATIENT
Start: 2025-01-30 | End: 2025-01-30 | Stop reason: SURG

## 2025-01-30 RX ORDER — SODIUM CHLORIDE 0.9 % (FLUSH) 0.9 %
3 SYRINGE (ML) INJECTION EVERY 12 HOURS SCHEDULED
Status: DISCONTINUED | OUTPATIENT
Start: 2025-01-30 | End: 2025-01-30 | Stop reason: HOSPADM

## 2025-01-30 RX ADMIN — PROPOFOL 100 MG: 10 INJECTION, EMULSION INTRAVENOUS at 11:30

## 2025-01-30 RX ADMIN — CEFTRIAXONE SODIUM 1 G: 1 INJECTION, POWDER, FOR SOLUTION INTRAMUSCULAR; INTRAVENOUS at 11:33

## 2025-01-30 RX ADMIN — ONDANSETRON 4 MG: 2 INJECTION INTRAMUSCULAR; INTRAVENOUS at 11:30

## 2025-01-30 RX ADMIN — LIDOCAINE HYDROCHLORIDE 50 MG: 20 INJECTION, SOLUTION INTRAVENOUS at 11:30

## 2025-01-30 RX ADMIN — Medication 100 MCG: at 11:46

## 2025-01-30 RX ADMIN — MIDAZOLAM HYDROCHLORIDE 2 MG: 1 INJECTION, SOLUTION INTRAMUSCULAR; INTRAVENOUS at 11:15

## 2025-01-30 RX ADMIN — Medication 100 MCG: at 11:49

## 2025-01-30 RX ADMIN — ACETAMINOPHEN 1000 MG: 500 TABLET ORAL at 10:12

## 2025-01-30 RX ADMIN — SODIUM CHLORIDE, POTASSIUM CHLORIDE, SODIUM LACTATE AND CALCIUM CHLORIDE 9 ML/HR: 600; 310; 30; 20 INJECTION, SOLUTION INTRAVENOUS at 10:12

## 2025-01-30 RX ADMIN — PROPOFOL 200 MCG/KG/MIN: 10 INJECTION, EMULSION INTRAVENOUS at 11:30

## 2025-01-30 RX ADMIN — DEXAMETHASONE SODIUM PHOSPHATE 4 MG: 4 INJECTION, SOLUTION INTRAMUSCULAR; INTRAVENOUS at 11:30

## 2025-01-30 NOTE — ANESTHESIA POSTPROCEDURE EVALUATION
Patient: Roddy Steen    Procedure Summary       Date: 01/30/25 Room / Location: [facility] OR 06 / [facility] MAIN OR    Anesthesia Start: 1127 Anesthesia Stop: 1154    Procedure: PROSTATE ULTRASOUND BIOPSY MRI FUSION WITH URONAV Diagnosis:       Elevated prostate specific antigen (PSA)      (Elevated prostate specific antigen (PSA) [R97.20])    Surgeons: Severo Garcia MD Provider: Jenaro Albarran MD    Anesthesia Type: general ASA Status: 3            Anesthesia Type: general    Vitals  Vitals Value Taken Time   /82 01/30/25 1232   Temp 36.4 °C (97.5 °F) 01/30/25 1230   Pulse 61 01/30/25 1235   Resp 14 01/30/25 1235   SpO2 95 % 01/30/25 1235   Vitals shown include unfiled device data.        Post Anesthesia Care and Evaluation    Patient location during evaluation: bedside  Patient participation: complete - patient participated  Level of consciousness: awake    Airway patency: patent  PONV Status: none  Cardiovascular status: acceptable  Respiratory status: acceptable  Hydration status: acceptable

## 2025-01-30 NOTE — ANESTHESIA PREPROCEDURE EVALUATION
Anesthesia Evaluation     Patient summary reviewed and Nursing notes reviewed                Airway   Mallampati: I  TM distance: >3 FB  Neck ROM: full  No difficulty expected  Dental      Pulmonary - negative pulmonary ROS and normal exam    breath sounds clear to auscultation  (+) COPD, asthma,sleep apnea  Cardiovascular - negative cardio ROS and normal exam    Rhythm: regular  Rate: normal    (+) hypertension, valvular problems/murmurs AI and MR, hyperlipidemia      Neuro/Psych- negative ROS  (+) headaches, psychiatric history Anxiety, Depression and PTSD  GI/Hepatic/Renal/Endo - negative ROS   (+) GERD    Musculoskeletal (-) negative ROS    Abdominal    Substance History - negative use     OB/GYN negative ob/gyn ROS         Other   arthritis,     ROS/Med Hx Other: ECHO 03/07/24 12:10  There is normal RV chamber size and function.  Normal LV cavity size with normal wall motion and good contractility of  All segments.  EF 60%.  Mitral valve inflow suggests normal diastolic filling pattern.  There is  Mild mitral regurgitation.  Mitral leaflets are minimally thickened but coapt  well. No prolapse or stenosis.  Aortic valve is not fully well visualized but appears trileaflet, somewhat  sclerotic but opens well.  There is probably moderate aortic insufficiency  But normal aortic root size.  Tricuspid valve appears pliable.  There is insufficient regurgitation to  Gauge RV systolic pressures.  Pulmonic valve is not well seen but there are normal pulmonic valve  velocities. No obvious intracavitary mass or pericardial effusion.  Inferior vena cava appears within normal diameter suggesting normal right  Atrial pressures. Aortic arch is not well seen.  CONCLUSION:  Aortic sclerosis with probably moderate aortic insufficiency  But normal aortic root size.  Preserved LV systolic function, EF 60%.                Anesthesia Plan    ASA 3     general     intravenous induction     Anesthetic plan, risks, benefits, and  alternatives have been provided, discussed and informed consent has been obtained with: patient.      CODE STATUS:

## 2025-01-30 NOTE — OP NOTE
PROSTATE ULTRASOUND BIOPSY MRI FUSION WITH URONAV  Procedure Report    Patient Name:  Roddy Steen  YOB: 1960    Date of Surgery:  1/30/2025      Pre-op Diagnosis:   Elevated prostate specific antigen (PSA) [R97.20]       Postop diagnosis:    Same    Procedure/CPT® Codes:  No CPT Code Applied in Case Entry    Procedure(s):    MRI fusion transrectal ultrasound guided prostate biopsy      Staff:  Surgeon(s):  Severo Garcia MD         Anesthesia: Choice    Estimated Blood Loss: minimal    Implants:    Nothing was implanted during the procedure    Specimen:          Specimens       ID Source Type Tests Collected By Collected At Frozen?    A Prostate Tissue TISSUE PATHOLOGY EXAM   Severo Garcia MD 1/30/25 1043     Description: left base x2    This specimen was not marked as sent.    B Prostate Tissue TISSUE PATHOLOGY EXAM   Severo Garcia MD 1/30/25 1043     Description: left mid x2    This specimen was not marked as sent.    C Prostate Tissue TISSUE PATHOLOGY EXAM   Severo Garcia MD 1/30/25 1043     Description: left apex x2    This specimen was not marked as sent.    D Prostate Tissue TISSUE PATHOLOGY EXAM   Severo Garcia MD 1/30/25 1044     Description: right base x2    This specimen was not marked as sent.    E Prostate Tissue TISSUE PATHOLOGY EXAM   Severo Garcia MD 1/30/25 1044     Description: right mid x2    This specimen was not marked as sent.    F Prostate Tissue TISSUE PATHOLOGY EXAM   Severo Garcia MD 1/30/25 1044     Description: right apex x2    This specimen was not marked as sent.    G Prostate Tissue TISSUE PATHOLOGY EXAM   Severo Garcia MD 1/30/25 1044     Description: JAYDON #1 left anterior x 4    This specimen was not marked as sent.                Findings:     none    Complications: none    Description of Procedure:     After informed consent patient was taken to the operating room.  Patient was laid supine and placed under monitored anesthesia  care by the anesthesia team.  At this point a multidisciplinary timeout was undertaken documenting the correct patient site and procedure.  Patient was laid on his left side down in fetal position.  Ultrasound probe was placed into the rectum and the prostate was visualized without issue.  A sweep was done from base to apex to link the real-time ultrasound to the MRI data.  The region of interest was identified and biopsies were taken from the region of interest.  I then did 12 systematic biopsies starting on the left side.  2 from the base, 2 from the mid and 2 from the apex.  These were done medially and laterally.  I then did the right side in the same fashion.  Patient tolerated the procedure well.  There were no intraoperative complications.  Minimal bleeding from the rectum.  Patient was awakened and taken to the postanesthesia care unit without problem.                Severo Garcia MD     Date: 1/30/2025  Time: 11:48 EST

## 2025-01-30 NOTE — DISCHARGE INSTRUCTIONS
Discharge Instructions Prostate Biopsy       For your surgery you had:  IV sedation  You may experience dizziness, drowsiness, or lightheadedness for several hours following surgery.  Do not stay alone today or tonight.  Limit your activity for 24 hours.  You should not drive, operate machinery, drink alcohol, or sign legally binding documents for 24 hours or while you are taking pain medication.  Resume your diet slowly.  Follow any special dietary instructions you may have been given by your doctor.  Last dose of pain medication given at: TYLENOL 1000MG AT 10:12 DO NOT EXCEED 4000MG IN 24HOURS  .  NOTIFY YOUR DOCTOR IF YOU EXPERIENCE ANY OF THE FOLLOWING:  Temperature greater than 101 degrees Fahrenheit  Shaking Chills  Redness or excessive drainage from incision  Nausea, vomiting and/or pain that is not controlled by prescribed medications  Increase in bleeding or bleeding that is excessive  Unable to urinate in 6 hours after surgery  If unable to reach your doctor, please go to the closest Emergency Room.   Drink 4-6 glasses of water a day for 3-4 days  You may see blood in your urine for up to a week, blood in your stool for 3-4 days, and blood in semen for up to a month  If you are passing large clots, call your doctor  Avoid lifting or straining for 48 hours  It is normal to experience burning during urination for 48 hours after biopsy  Call your doctor if pain, burning, urgency, or frequency of urination persist beyond 48 hours  Medications per physician as indicated on discharge medication information sheet  Health Records by going to: [email or web address]   SPECIAL INSTRUCTIONS:  SEE AFTER VISIT SUMMARY

## 2025-02-03 ENCOUNTER — TELEPHONE (OUTPATIENT)
Dept: UROLOGY | Age: 65
End: 2025-02-03
Payer: OTHER GOVERNMENT

## 2025-02-03 LAB
CYTO UR: NORMAL
LAB AP CASE REPORT: NORMAL
LAB AP CLINICAL INFORMATION: NORMAL
LAB AP SPECIAL STAINS: NORMAL
PATH REPORT.FINAL DX SPEC: NORMAL
PATH REPORT.GROSS SPEC: NORMAL

## 2025-02-14 PROBLEM — C61 PROSTATE CANCER: Status: ACTIVE | Noted: 2025-02-14

## 2025-02-17 ENCOUNTER — TELEPHONE (OUTPATIENT)
Dept: UROLOGY | Age: 65
End: 2025-02-17

## 2025-02-17 NOTE — TELEPHONE ENCOUNTER
Caller: Roddy Steen    Relationship:  Self    Best call back number: [phone]    PATIENT CALLED REQUESTING TO CANCEL SAME DAY APPT.    Did the patient call AFTER the start time of their scheduled appointment?  [x]YES  []NO    Was the patient's appointment rescheduled? []YES  [x]NO    Any additional information: PATIENT CALLED TO R/S HIS POST OP APPT W/ DR MORALES. HUB UNABLE TO WARM TRANSFER. HE WAS UNABLE TO MAKE IT IN DUE TO WEATHER & CAR TROUBLE. HE IS AVAILABLE ANYTIME & GAVE THE OK TO MESSAGE VIA MY CHART IF HE DOESN'T ANSWER. PLEASE CALL PATIENT BACK FOR ASSISTANCE.

## 2025-02-24 NOTE — PROGRESS NOTES
Chief Complaint: Urologic complaint    Subjective         History of Present Illness  Roddy Steen is a 64 y.o. male    Prostatic adenocarcinoma clinical T1c    Doing ok after bx.    No GH    No frequency/incontinence  Some urgency  Nocturia 2-3        PVR    1/25    000        Father with prostate cancer at 75, brother with prostate cancer 59  COPD, pulmonary fibrosis  No anticoagulation  Quit smoking 2011 smoked for 35 years    No trouble with ED.    2005  Appendectomy, no other abdominal surgeries      Previous    History of vasectomy        2/22 0.7,       Prostatic adenocarcinoma clinical T1c      1/30/25  MRI bx  Left mid-3+3, 1/2, 3%  Left apex - 3+3, 1/2, 2%  Right mid-3+3, 1/2, 6%  Right apex-3+3, 1/2, 20%  Region of interest-3+3, 2/4, 24%    1/25 MRI prostate 29 g  PI-RADS 4-1.3 x 1.0 cm anterior transition zone mid, no EPE  Thick-walled urinary bladder    7/24    6.8      Objective     Past Medical History:   Diagnosis Date    Asthma     COPD (chronic obstructive pulmonary disease)     Emphysema of lung     Fibrosis lung     History of colonoscopy     Complete    Hypertension     IBS (irritable bowel syndrome)     Migraine     PTSD (post-traumatic stress disorder)     Rotator cuff tear     Sleep apnea        Past Surgical History:   Procedure Laterality Date    APPENDECTOMY  02/2005    PALATOPHARYNGOPLASTY  06/2010    PROSTATE BIOPSY N/A 1/30/2025    Procedure: PROSTATE ULTRASOUND BIOPSY MRI FUSION WITH URONAV;  Surgeon: Severo Garcia MD;  Location: [facility];  Service: Urology;  Laterality: N/A;    ROTATOR CUFF REPAIR Right 11/2015    VASECTOMY  06/1963    Vas Deferens Vasectomy                         Assessment and Plan    Diagnoses and all orders for this visit:    1. Prostate cancer (Primary)      Today in clinic the patient was counseled on the risk and benefits of laparoscopic robotic prostatectomy.  All risk and benefits were discussed including but not limited to the risk of  bleeding, infection, damage to adjacent structures, anesthetic complications and all other complications up to and including death.       We also discussed the alternatives of laparoscopic robotic prostatectomy including the risk and benefits of each.  This included but was not limited to brachy therapy, external beam radiation therapy, open prostatectomy, active surveillance and also watchful waiting.         We also discussed today in clinic the side effects of surgery including erectile dysfunction and urinary incontinence.  The patient understands that his erections will not be as good as they are now after surgery.  We also discussed he may get no erections after surgery.  We also discussed that the patient will leak urine after surgery and this gets better over time usually taking a year to see a new baseline continence. The treatments of these were also discussed.  Patient understands these risks and acknowledges understanding.         We also discussed radiation therapy and encouraged the patient to seek an opinion a radiation oncologist.  We discussed the different radiation modalities including IMRTand brachytherapy.  We discussed a 10 year outcomes from radiation and surgery appears similar.  We discussed the risk of radiation including erectile dysfunction, radiation cystitis, proctitis and secondary malignancies.       We also discussed alternative therapies including HIFU and cryotherapy.  We discussed these or not currently NCCN guidelines and are not considered standard of care currently.       We discussed that active surveillance is an option for a patient with low risk prostate cancer.  The risks of surveillance include progression of disease, failure of clinical staging to detect advanced disease, need for strict followup, need for repeat prostate biopsies, and patient anxiety related to PSA.  We did discuss that the evidence suggests that patient's who progress to treatment on surveillance have  survival similar to those treated at diagnosis.        Patient is with low risk prostate cancer after discussion of risk and benefits of go ahead and initiate active surveillance      GPS score for risk     Follow-up in 5 months with PSA

## 2025-02-28 ENCOUNTER — OFFICE VISIT (OUTPATIENT)
Dept: UROLOGY | Age: 65
End: 2025-02-28
Payer: OTHER GOVERNMENT

## 2025-02-28 DIAGNOSIS — C61 PROSTATE CANCER: Primary | ICD-10-CM

## 2025-03-27 LAB
CYTO UR: NORMAL
LAB AP CASE REPORT: NORMAL
LAB AP CLINICAL INFORMATION: NORMAL
LAB AP SPECIAL STAINS: NORMAL
PATH REPORT.ADDENDUM SPEC: NORMAL
PATH REPORT.FINAL DX SPEC: NORMAL
PATH REPORT.GROSS SPEC: NORMAL

## 2025-07-30 ENCOUNTER — TELEPHONE (OUTPATIENT)
Dept: UROLOGY | Age: 65
End: 2025-07-30
Payer: OTHER GOVERNMENT

## 2025-07-30 NOTE — TELEPHONE ENCOUNTER
Caller: Roddy Steen    Relationship: Self    Best call back number: [phone]    What form or medical record are you requesting: LAB ORDER FOR PSA    Who is requesting this form or medical record from you: [facility], DANIELLE SNELLEN    How would you like to receive the form or medical records (pick-up, mail, fax): FAX  If fax, what is the fax number: [phone]      Timeframe paperwork needed: BEFORE 1:00 PM 07.31.2025    Additional notes: THANK  YOU

## 2025-07-30 NOTE — TELEPHONE ENCOUNTER
Pt answered.    I let the Pt know the PSA Lab Orders have been faxed to [facility]. Pt verbalized understanding.

## 2025-08-05 ENCOUNTER — OFFICE VISIT (OUTPATIENT)
Dept: UROLOGY | Age: 65
End: 2025-08-05
Payer: OTHER GOVERNMENT

## 2025-08-05 ENCOUNTER — TELEPHONE (OUTPATIENT)
Dept: UROLOGY | Age: 65
End: 2025-08-05

## 2025-08-05 VITALS — BODY MASS INDEX: 27.58 KG/M2 | WEIGHT: 182 LBS | HEIGHT: 68 IN

## 2025-08-05 DIAGNOSIS — C61 PROSTATE CANCER: Primary | ICD-10-CM

## 2025-08-05 RX ORDER — GUAIFENESIN 1200 MG/1
1 TABLET, EXTENDED RELEASE ORAL 2 TIMES DAILY
COMMUNITY
Start: 2025-03-11

## 2025-08-05 RX ORDER — LEVOCETIRIZINE DIHYDROCHLORIDE 5 MG/1
5 TABLET, FILM COATED ORAL DAILY
COMMUNITY

## 2025-08-05 RX ORDER — BUDESONIDE, GLYCOPYRROLATE, AND FORMOTEROL FUMARATE 160; 9; 4.8 UG/1; UG/1; UG/1
2 AEROSOL, METERED RESPIRATORY (INHALATION) 2 TIMES DAILY
COMMUNITY
Start: 2025-07-21

## (undated) DEVICE — SHEET,DRAPE,70X85,STERILE: Brand: MEDLINE

## (undated) DEVICE — MAX-CORE® DISPOSABLE CORE BIOPSY INSTRUMENT, 18G X 25CM: Brand: MAX-CORE

## (undated) DEVICE — DRSNG TELFA PAD NONADH STR 1S 3X4IN

## (undated) DEVICE — THE STERILE LIGHT HANDLE COVER IS USED WITH STERIS SURGICAL LIGHTING AND VISUALIZATION SYSTEMS.

## (undated) DEVICE — MARKER,SKIN,WI/RULER AND LABELS: Brand: MEDLINE

## (undated) DEVICE — TOWEL,OR,DSP,ST,BLUE,STD,4/PK,20PK/CS: Brand: MEDLINE